# Patient Record
Sex: FEMALE | Race: OTHER | Employment: FULL TIME | URBAN - NONMETROPOLITAN AREA
[De-identification: names, ages, dates, MRNs, and addresses within clinical notes are randomized per-mention and may not be internally consistent; named-entity substitution may affect disease eponyms.]

---

## 2018-11-15 ENCOUNTER — HOSPITAL ENCOUNTER (EMERGENCY)
Age: 48
Discharge: HOME | End: 2018-11-15
Attending: EMERGENCY MEDICINE

## 2018-11-15 ENCOUNTER — APPOINTMENT (OUTPATIENT)
Dept: CT IMAGING | Age: 48
End: 2018-11-15

## 2018-11-15 VITALS
SYSTOLIC BLOOD PRESSURE: 109 MMHG | DIASTOLIC BLOOD PRESSURE: 66 MMHG | WEIGHT: 200 LBS | HEART RATE: 62 BPM | OXYGEN SATURATION: 99 % | RESPIRATION RATE: 16 BRPM | BODY MASS INDEX: 36.8 KG/M2 | TEMPERATURE: 97.8 F | HEIGHT: 62 IN

## 2018-11-15 DIAGNOSIS — R51.9 ACUTE NONINTRACTABLE HEADACHE, UNSPECIFIED HEADACHE TYPE: Primary | ICD-10-CM

## 2018-11-15 PROCEDURE — 6360000002 HC RX W HCPCS: Performed by: EMERGENCY MEDICINE

## 2018-11-15 PROCEDURE — 99284 EMERGENCY DEPT VISIT MOD MDM: CPT

## 2018-11-15 PROCEDURE — 70450 CT HEAD/BRAIN W/O DYE: CPT

## 2018-11-15 PROCEDURE — 96372 THER/PROPH/DIAG INJ SC/IM: CPT

## 2018-11-15 RX ORDER — KETOROLAC TROMETHAMINE 30 MG/ML
30 INJECTION, SOLUTION INTRAMUSCULAR; INTRAVENOUS ONCE
Status: COMPLETED | OUTPATIENT
Start: 2018-11-15 | End: 2018-11-15

## 2018-11-15 RX ORDER — NAPROXEN 500 MG/1
500 TABLET ORAL 2 TIMES DAILY
Qty: 10 TABLET | Refills: 0 | Status: SHIPPED | OUTPATIENT
Start: 2018-11-15

## 2018-11-15 RX ORDER — BUTALBITAL, ACETAMINOPHEN AND CAFFEINE 300; 40; 50 MG/1; MG/1; MG/1
1 CAPSULE ORAL EVERY 4 HOURS PRN
Qty: 15 CAPSULE | Refills: 0 | Status: SHIPPED | OUTPATIENT
Start: 2018-11-15

## 2018-11-15 RX ADMIN — KETOROLAC TROMETHAMINE 30 MG: 30 INJECTION, SOLUTION INTRAMUSCULAR at 17:44

## 2018-11-15 ASSESSMENT — PAIN DESCRIPTION - LOCATION: LOCATION: HEAD

## 2018-11-15 ASSESSMENT — PAIN DESCRIPTION - PAIN TYPE
TYPE: ACUTE PAIN
TYPE: ACUTE PAIN

## 2018-11-15 ASSESSMENT — PAIN SCALES - GENERAL
PAINLEVEL_OUTOF10: 10
PAINLEVEL_OUTOF10: 10
PAINLEVEL_OUTOF10: 4

## 2018-11-15 ASSESSMENT — PAIN DESCRIPTION - DIRECTION: RADIATING_TOWARDS: NECK

## 2018-11-15 NOTE — ED PROVIDER NOTES
Normal range of motion,  No stridor. No tenderness, Supple,  Eyes:  No discharge or  Swelling,Conjunctiva normal, PERRL, EOMI,  Respiratory: No respiratory distress, Normal breath sounds,  No wheezing, No chest tenderness. Cardiovascular:  Normal heart rate, Normal rhythm, No murmurs, No rubs, No gallops. GI:  No reproducible pain, Bowel sounds normal, Soft, No masses, No pulsatile masses. No tenderness  Musculoskeletal:  Intact distal pulses, No edema, No tenderness, No cyanosis, No clubbing. Good range of motion in all major joints. No tenderness to palpation or major deformities noted. Back:No tenderness. Integument:  Warm, Dry, No erythema, No rash (on exposed areas)   Lymphatic:  No lymphadenopathy noted. Neurologic:  Alert & oriented x 3, Normal motor function, Normal sensory function, No focal deficits noted. Psychiatric:  Affect normal, Judgment normal, Mood normal.                     RADIOLOGY    CT HEAD WO CONTRAST   Final Result   1. Negative CT for active pathology of the brain. 2. Small calcification along the anterior interhemispheric fissure. **This report has been created using voice recognition software. It may contain minor errors which are inherent in voice recognition technology. **      Final report electronically signed by Dr. Steve Garcia on 11/15/2018 6:29 PM          PROCEDURES    none    CONSULTS:  None      CRITICAL CARE:  None    SCREENINGS  /68   Pulse 60   Temp 97.8 °F (36.6 °C) (Oral)   Resp 16   Ht 5' 2\" (1.575 m)   Wt 200 lb (90.7 kg)   SpO2 98%   BMI 36.58 kg/m²        Screening For Hypertension and Follow-up (#317)  patient informed that blood pressure is normal but should always be re-assessed by primary care      Screening For Tobacco Use and Cessation Intervention (#226):   reports that she has never smoked.  She has never used smokeless tobacco.  Non-smoker not applicable for screen      ED COURSE & MEDICAL DECISION MAKING    Pertinent Labs &